# Patient Record
(demographics unavailable — no encounter records)

---

## 2025-03-18 NOTE — PLAN
[FreeTextEntry1] : 38 year old female presents for routine gyn exam PMHx: thyroid ca SHx: c/s, LEEP 2009, double hernia, laparoscopic myomectomy BSE taught Breast and pelvic exam performed Mammogram and sonogram Rx sent (due now)  Pap/HPV conducted (last 03/2026) Schedule Pelvic sono for 03/2026 (last 03/18/2025)  Hx of Thyroid Ca:  Enlarged thyroid noted on exam, wnl for pt Advised pt to follow w/ Dr. Licona due to thyroid hx  RTO in 1 year or PRN

## 2025-03-18 NOTE — PHYSICAL EXAM
[Chaperone Present] : A chaperone was present in the examining room during all aspects of the physical examination [Appropriately responsive] : appropriately responsive [Alert] : alert [No Acute Distress] : no acute distress [Regular Rate Rhythm] : regular rate rhythm [No Murmurs] : no murmurs [Clear to Auscultation B/L] : clear to auscultation bilaterally [Soft] : soft [Non-tender] : non-tender [Non-distended] : non-distended [No HSM] : No HSM [No Lesions] : no lesions [No Mass] : no mass [Oriented x3] : oriented x3 [Examination Of The Breasts] : a normal appearance [No Masses] : no breast masses were palpable [Labia Majora] : normal [Labia Minora] : normal [Normal] : normal [Uterine Adnexae] : normal [FreeTextEntry2] :  Michelle herman) [FreeTextEntry3] : enlarged thyroid,

## 2025-03-18 NOTE — HISTORY OF PRESENT ILLNESS
[Patient reported PAP Smear was normal] : Patient reported PAP Smear was normal [FreeTextEntry1] : 2025. ERIC GALVIN 38 year old female  LMP 2025 presents for annual visit. PMHx: thyroid ca, SHx: c/s, LEEP 2009, double hernia, laparoscopic myomectomy, BS.    She feels well and offers no complaints. She has monthly menses, not too heavy or painful. She denies intermenstrual bleeding, abn discharge or vaginitis sxs. No urinary complaints. She has normal BM, no bloody stool. She denies abdominal or pelvic pain.    Denies changes in medical status, medications, serious illness, hospitalizations, and surgeries. Pt continues to see Dr. Licona annually due to thyroid hx.    OBHx:  GynHx: No Hx of STI, fibroids, ovarian cysts, abnl paps, or pelvic infections. PMH: thyroid ca SHx: c/s, LEEP 2009, double hernia, laparoscopic myomectomy, tubal ligation Meds: denies  All: NKDA Soc: No alcohol, drug, or tobacco use, non-smoker. FHx: Denies FHx of ovarian, uterine or colon cancer. MGM breast ca 45yo PHQ9=0 [TextBox_4] : Pelvic US 03/18- no fibroids, EM 5.8mm, ovaries normal w/ 1cm follicle noted [TextBox_19] : never done, due now [TextBox_25] : never done, due now [PapSmeardate] : 03/2024 [TextBox_31] : NILM, HRHPV neg